# Patient Record
Sex: FEMALE | Race: BLACK OR AFRICAN AMERICAN | NOT HISPANIC OR LATINO | URBAN - METROPOLITAN AREA
[De-identification: names, ages, dates, MRNs, and addresses within clinical notes are randomized per-mention and may not be internally consistent; named-entity substitution may affect disease eponyms.]

---

## 2018-12-01 ENCOUNTER — OUTPATIENT (OUTPATIENT)
Dept: OUTPATIENT SERVICES | Facility: HOSPITAL | Age: 33
LOS: 1 days | End: 2018-12-01
Payer: MEDICAID

## 2018-12-01 PROCEDURE — G9001: CPT

## 2018-12-03 DIAGNOSIS — Z71.89 OTHER SPECIFIED COUNSELING: ICD-10-CM

## 2020-03-07 ENCOUNTER — EMERGENCY (EMERGENCY)
Facility: HOSPITAL | Age: 35
LOS: 1 days | Discharge: HOME | End: 2020-03-07
Attending: EMERGENCY MEDICINE | Admitting: EMERGENCY MEDICINE
Payer: MEDICAID

## 2020-03-07 VITALS
SYSTOLIC BLOOD PRESSURE: 120 MMHG | DIASTOLIC BLOOD PRESSURE: 82 MMHG | HEIGHT: 65 IN | TEMPERATURE: 98 F | WEIGHT: 139.99 LBS | RESPIRATION RATE: 16 BRPM | OXYGEN SATURATION: 99 % | HEART RATE: 82 BPM

## 2020-03-07 VITALS
SYSTOLIC BLOOD PRESSURE: 110 MMHG | OXYGEN SATURATION: 99 % | HEART RATE: 80 BPM | RESPIRATION RATE: 18 BRPM | DIASTOLIC BLOOD PRESSURE: 70 MMHG | TEMPERATURE: 98 F

## 2020-03-07 LAB — HCG UR QL: NEGATIVE — SIGNIFICANT CHANGE UP

## 2020-03-07 PROCEDURE — 81025 URINE PREGNANCY TEST: CPT

## 2020-03-07 PROCEDURE — 96372 THER/PROPH/DIAG INJ SC/IM: CPT

## 2020-03-07 PROCEDURE — 99283 EMERGENCY DEPT VISIT LOW MDM: CPT | Mod: 25

## 2020-03-07 PROCEDURE — 99283 EMERGENCY DEPT VISIT LOW MDM: CPT

## 2020-03-07 RX ORDER — KETOROLAC TROMETHAMINE 30 MG/ML
60 SYRINGE (ML) INJECTION ONCE
Refills: 0 | Status: DISCONTINUED | OUTPATIENT
Start: 2020-03-07 | End: 2020-03-07

## 2020-03-07 RX ADMIN — Medication 60 MILLIGRAM(S): at 19:19

## 2020-03-07 RX ADMIN — Medication 60 MILLIGRAM(S): at 19:45

## 2020-03-07 RX ADMIN — Medication 1 TABLET(S): at 19:19

## 2020-03-07 NOTE — ED PROVIDER NOTE - NSFOLLOWUPINSTRUCTIONS_ED_ALL_ED_FT
Follow up with your dentist in 1-2 days for re-evaluation, ongoing care and treatment. Take full course of antibiotics as prescribed, motrin 600mg every 6 hours with food as needed for pain. If having difficulty breathing or swallowing, fever, worsening of symptoms or other related symptoms, RETURN TO THE ER IMMEDIATELY.

## 2020-03-07 NOTE — ED PROVIDER NOTE - ENMT, MLM
Airway patent, Nasal mucosa clear. +site of wisdom tooth extraction noted to L lower posterior gum with mild ttp, no surrounding erythema/swelling/drainage/fluctuance/induration noted, no drooling/stridor/trismus/hoarseness noted. No facial or neck swelling noted.  Throat has no vesicles, no oropharyngeal exudates and uvula is midline. Airway patent, Nasal mucosa clear. +site of tooth extraction noted to L lower posterior gum with mild ttp, no surrounding erythema/swelling/drainage/fluctuance/induration noted, no drooling/stridor/trismus/hoarseness noted. No facial or neck swelling noted.  Throat has no vesicles, no oropharyngeal exudates and uvula is midline.

## 2020-03-07 NOTE — ED PROVIDER NOTE - NSFOLLOWUPCLINICS_GEN_ALL_ED_FT
[facility]  Dental  [address] [place] [id number]  Phone: [phone]  Fax:   Follow Up Time: 1-3 Days    Oral & Maxillofacial Surgery  Department of Dental Medicine  [facility] [place] [id number]  Phone: [phone]  Fax: [phone]  Follow Up Time: 1-3 Days

## 2020-03-07 NOTE — ED PROVIDER NOTE - OBJECTIVE STATEMENT
33 y/o F presents with c/o mouth pain x 9 days. Pt states that she had L lower wisdom tooth extraction 9 days ago at [facility] dental dept by the oral surgeon, was given 7 tabs of hydrocodone after procedure but states that she has had no relief of pain with medication and it's getting worse. Denies fever, chills, n/v, difficulty breathing or swallowing, drooling, hoarseness, neck/facial swelling, headache, dizziness or other symptoms. 35 y/o F presents with c/o mouth pain x 9 days. Pt states that she had L lower posterior molar tooth extraction 9 days ago at [facility] dental dept by the oral surgeon, was given 7 tabs of hydrocodone after procedure but states that she has had no relief of pain with medication and it's getting worse and admits to mild foul smell discharge. Denies fever, chills, n/v, difficulty breathing or swallowing, drooling, hoarseness, neck/facial swelling, headache, dizziness or other symptoms.

## 2020-03-07 NOTE — ED PROVIDER NOTE - CLINICAL SUMMARY MEDICAL DECISION MAKING FREE TEXT BOX
35 yo F co L lower mouth pain sp L lower wisdom tooth extraction 9 days ago at [facility] dental clinic, no fever/facial swelling/difficulty breathing or swallowing, no relief with hydrocodone, oral exam with healing gum noted, no signs of infection, VSS, will check upreg, toradol, dc home on motrin, abx and fu dental clinic. 35 yo F co L lower mouth pain sp L lower wisdom tooth extraction 9 days ago at [facility] dental clinic, no fever/facial swelling/difficulty breathing or swallowing, no relief with hydrocodone, oral exam without gum swelling/erythema/discharge/fluctuant, VSS, will check upreg, toradol, dc home on motrin, abx and fu dental clinic.

## 2020-03-07 NOTE — ED PROVIDER NOTE - PROGRESS NOTE DETAILS
Stephanie Ibrahim: 33 y/o female s/p L lower molar extracted 9 days ago at [facility] dental Pt with continued pain to site with small foul smell discharge. Denies fever, chills, dizziness, N/V, SOB, neck pain, throat pain, difficulty swallowing.  Physical exam: WD, wn, NAD, MMM, L lower molar extraction site, no swelling or erythema or discharge or drainage, mild tenderness to gum S1S2, RRR, lungs CTA. Pt examined by ED attending, Dr. Ibrahim who agreed with disposition and plan. Reevaluated patient at bedside.  Patient feeling much improved.  An opportunity to ask questions was given. Will dc home on augmentin and motrin, f/u dentist. Discussed the importance of prompt, close medical follow-up.  Patient will return with any changes, concerns or persistent / worsening symptoms.  Understanding of all instructions verbalized. Stephanie Ibrahim: 33 y/o female s/p L lower molar extracted 9 days ago at [facility] dental Pt with continued pain to site with small foul smell discharge. Denies fever, chills, dizziness, N/V, SOB, neck pain, throat pain, difficulty swallowing.  Physical exam: WD, wn, NAD, MMM, L lower molar extraction site, no swelling or erythema or discharge or drainage, mild tenderness to gum throat no swelling, erythema, vesicles, exudate, S1S2, RRR, lungs CTA.

## 2020-03-07 NOTE — ED PROVIDER NOTE - PATIENT PORTAL LINK FT
You can access the FollowMyHealth Patient Portal offered by [facility] by registering at the following website: [email or web address]. By joining [facility]’s FollowMyHealth portal, you will also be able to view your health information using other applications (apps) compatible with our system.